# Patient Record
Sex: FEMALE | Race: WHITE | NOT HISPANIC OR LATINO
[De-identification: names, ages, dates, MRNs, and addresses within clinical notes are randomized per-mention and may not be internally consistent; named-entity substitution may affect disease eponyms.]

---

## 2017-06-06 ENCOUNTER — RESULT REVIEW (OUTPATIENT)
Age: 77
End: 2017-06-06

## 2018-01-16 ENCOUNTER — OUTPATIENT (OUTPATIENT)
Dept: OUTPATIENT SERVICES | Facility: HOSPITAL | Age: 78
LOS: 1 days | Discharge: HOME | End: 2018-01-16

## 2018-01-16 DIAGNOSIS — Z90.710 ACQUIRED ABSENCE OF BOTH CERVIX AND UTERUS: Chronic | ICD-10-CM

## 2018-01-16 DIAGNOSIS — Z96.7 PRESENCE OF OTHER BONE AND TENDON IMPLANTS: Chronic | ICD-10-CM

## 2018-01-16 DIAGNOSIS — Z98.89 OTHER SPECIFIED POSTPROCEDURAL STATES: Chronic | ICD-10-CM

## 2018-01-16 DIAGNOSIS — Z98.49 CATARACT EXTRACTION STATUS, UNSPECIFIED EYE: Chronic | ICD-10-CM

## 2018-01-16 DIAGNOSIS — Z96.642 PRESENCE OF LEFT ARTIFICIAL HIP JOINT: Chronic | ICD-10-CM

## 2018-01-16 DIAGNOSIS — K40.90 UNILATERAL INGUINAL HERNIA, WITHOUT OBSTRUCTION OR GANGRENE, NOT SPECIFIED AS RECURRENT: ICD-10-CM

## 2018-01-16 LAB
ANION GAP SERPL CALC-SCNC: 9 MMOL/L — SIGNIFICANT CHANGE UP (ref 5–17)
APTT BLD: 31.9 SEC — SIGNIFICANT CHANGE UP (ref 27.5–37.4)
BASOPHILS # BLD AUTO: 0.1 K/UL — SIGNIFICANT CHANGE UP (ref 0–0.2)
BASOPHILS NFR BLD AUTO: 1.6 % — SIGNIFICANT CHANGE UP (ref 0–2)
BUN SERPL-MCNC: 14 MG/DL — SIGNIFICANT CHANGE UP (ref 7–23)
CALCIUM SERPL-MCNC: 9.3 MG/DL — SIGNIFICANT CHANGE UP (ref 8.5–10.1)
CHLORIDE SERPL-SCNC: 102 MMOL/L — SIGNIFICANT CHANGE UP (ref 96–108)
CO2 SERPL-SCNC: 27 MMOL/L — SIGNIFICANT CHANGE UP (ref 22–31)
CREAT SERPL-MCNC: 0.52 MG/DL — SIGNIFICANT CHANGE UP (ref 0.5–1.3)
EOSINOPHIL # BLD AUTO: 0.1 K/UL — SIGNIFICANT CHANGE UP (ref 0–0.5)
EOSINOPHIL NFR BLD AUTO: 2.1 % — SIGNIFICANT CHANGE UP (ref 0–6)
GLUCOSE SERPL-MCNC: 90 MG/DL — SIGNIFICANT CHANGE UP (ref 70–99)
HCT VFR BLD CALC: 44.1 % — SIGNIFICANT CHANGE UP (ref 34.5–45)
HGB BLD-MCNC: 14.5 G/DL — SIGNIFICANT CHANGE UP (ref 11.5–15.5)
INR BLD: 0.95 RATIO — SIGNIFICANT CHANGE UP (ref 0.88–1.16)
LYMPHOCYTES # BLD AUTO: 2.2 K/UL — SIGNIFICANT CHANGE UP (ref 1–3.3)
LYMPHOCYTES # BLD AUTO: 36.5 % — SIGNIFICANT CHANGE UP (ref 13–44)
MCHC RBC-ENTMCNC: 30.3 PG — SIGNIFICANT CHANGE UP (ref 27–34)
MCHC RBC-ENTMCNC: 32.9 GM/DL — SIGNIFICANT CHANGE UP (ref 32–36)
MCV RBC AUTO: 92.2 FL — SIGNIFICANT CHANGE UP (ref 80–100)
MONOCYTES # BLD AUTO: 0.5 K/UL — SIGNIFICANT CHANGE UP (ref 0–0.9)
MONOCYTES NFR BLD AUTO: 8.5 % — SIGNIFICANT CHANGE UP (ref 2–14)
NEUTROPHILS # BLD AUTO: 3.1 K/UL — SIGNIFICANT CHANGE UP (ref 1.8–7.4)
NEUTROPHILS NFR BLD AUTO: 51.3 % — SIGNIFICANT CHANGE UP (ref 43–77)
PLATELET # BLD AUTO: 334 K/UL — SIGNIFICANT CHANGE UP (ref 150–400)
POTASSIUM SERPL-MCNC: 4.3 MMOL/L — SIGNIFICANT CHANGE UP (ref 3.5–5.3)
POTASSIUM SERPL-SCNC: 4.3 MMOL/L — SIGNIFICANT CHANGE UP (ref 3.5–5.3)
PROTHROM AB SERPL-ACNC: 10.2 SEC — SIGNIFICANT CHANGE UP (ref 9.8–12.7)
RBC # BLD: 4.78 M/UL — SIGNIFICANT CHANGE UP (ref 3.8–5.2)
RBC # FLD: 11.8 % — SIGNIFICANT CHANGE UP (ref 10.3–14.5)
SODIUM SERPL-SCNC: 138 MMOL/L — SIGNIFICANT CHANGE UP (ref 135–145)
WBC # BLD: 6 K/UL — SIGNIFICANT CHANGE UP (ref 3.8–10.5)
WBC # FLD AUTO: 6 K/UL — SIGNIFICANT CHANGE UP (ref 3.8–10.5)

## 2018-01-16 RX ORDER — LEVOTHYROXINE SODIUM 125 MCG
1 TABLET ORAL
Qty: 0 | Refills: 0 | COMMUNITY

## 2018-01-16 NOTE — CHART NOTE - NSCHARTNOTEFT_GEN_A_CORE
Plan  1. Stop all NSAIDS, herbal supplements and vitamins for 7 days.  2. NPO at midnight.  3. Take the following medications ( hydrocortisone, fludrocotisone ) with small sips of water on the morning of your procedure/surgery.  4. Use EZ sponges as directed  5. Use mupirocin as directed Plan  1. Stop all NSAIDS, herbal supplements and vitamins for 7 days.  2. NPO at midnight.  3. Take the following medications ( hydrocortisone, fludrocortisone ) with small sips of water on the morning of your procedure/surgery.  4. Use EZ sponges as directed  5. Use mupirocin as directed

## 2018-01-16 NOTE — ASU PATIENT PROFILE, ADULT - PMH
[place]'s disease    Dyslipidemia    Hypothyroidism    Osteoarthritis of right hip    Varicose veins [place]'s disease    Dyslipidemia    Hypothyroidism    Inguinal hernia    Osteoarthritis of right hip    Varicose veins

## 2018-01-16 NOTE — ASU PATIENT PROFILE, ADULT - PSH
History of cataract surgery  left 2017, right 01/2018  S/P bunionectomy  right, 2010  S/P foot surgery, right  reconstruction 2014  S/P hip replacement, left  2011, right THR 2016  S/P hysterectomy  1978  S/P ORIF (open reduction internal fixation) fracture  2013, left humerus

## 2018-01-22 ENCOUNTER — OUTPATIENT (OUTPATIENT)
Dept: OUTPATIENT SERVICES | Facility: HOSPITAL | Age: 78
LOS: 1 days | Discharge: HOME | End: 2018-01-22
Payer: MEDICARE

## 2018-01-22 ENCOUNTER — RESULT REVIEW (OUTPATIENT)
Age: 78
End: 2018-01-22

## 2018-01-22 VITALS
RESPIRATION RATE: 16 BRPM | OXYGEN SATURATION: 100 % | HEIGHT: 64 IN | WEIGHT: 133.38 LBS | DIASTOLIC BLOOD PRESSURE: 70 MMHG | TEMPERATURE: 98 F | HEART RATE: 66 BPM | SYSTOLIC BLOOD PRESSURE: 113 MMHG

## 2018-01-22 VITALS
DIASTOLIC BLOOD PRESSURE: 66 MMHG | TEMPERATURE: 98 F | OXYGEN SATURATION: 99 % | SYSTOLIC BLOOD PRESSURE: 125 MMHG | RESPIRATION RATE: 18 BRPM | HEART RATE: 76 BPM

## 2018-01-22 DIAGNOSIS — Z98.49 CATARACT EXTRACTION STATUS, UNSPECIFIED EYE: Chronic | ICD-10-CM

## 2018-01-22 DIAGNOSIS — Z96.7 PRESENCE OF OTHER BONE AND TENDON IMPLANTS: Chronic | ICD-10-CM

## 2018-01-22 DIAGNOSIS — Z98.89 OTHER SPECIFIED POSTPROCEDURAL STATES: Chronic | ICD-10-CM

## 2018-01-22 DIAGNOSIS — Z96.642 PRESENCE OF LEFT ARTIFICIAL HIP JOINT: Chronic | ICD-10-CM

## 2018-01-22 DIAGNOSIS — Z90.710 ACQUIRED ABSENCE OF BOTH CERVIX AND UTERUS: Chronic | ICD-10-CM

## 2018-01-22 PROCEDURE — 88302 TISSUE EXAM BY PATHOLOGIST: CPT | Mod: 26

## 2018-01-22 RX ORDER — LEVOTHYROXINE SODIUM 125 MCG
1 TABLET ORAL
Qty: 0 | Refills: 0 | COMMUNITY

## 2018-01-22 RX ORDER — FENTANYL CITRATE 50 UG/ML
50 INJECTION INTRAVENOUS
Qty: 0 | Refills: 0 | Status: DISCONTINUED | OUTPATIENT
Start: 2018-01-22 | End: 2018-01-22

## 2018-01-22 RX ORDER — MUPIROCIN 20 MG/G
1 OINTMENT TOPICAL
Qty: 0 | Refills: 0 | COMMUNITY

## 2018-01-22 RX ORDER — ONDANSETRON 8 MG/1
4 TABLET, FILM COATED ORAL ONCE
Qty: 0 | Refills: 0 | Status: DISCONTINUED | OUTPATIENT
Start: 2018-01-22 | End: 2018-01-22

## 2018-01-22 RX ORDER — HYDROCORTISONE 20 MG
0 TABLET ORAL
Qty: 0 | Refills: 0 | COMMUNITY

## 2018-01-22 RX ORDER — OXYCODONE HYDROCHLORIDE 5 MG/1
1 TABLET ORAL
Qty: 30 | Refills: 0 | OUTPATIENT
Start: 2018-01-22 | End: 2018-01-26

## 2018-01-22 RX ORDER — SODIUM CHLORIDE 9 MG/ML
1000 INJECTION INTRAMUSCULAR; INTRAVENOUS; SUBCUTANEOUS
Qty: 0 | Refills: 0 | Status: DISCONTINUED | OUTPATIENT
Start: 2018-01-22 | End: 2018-01-22

## 2018-01-22 NOTE — BRIEF OPERATIVE NOTE - PROCEDURE
<<-----Click on this checkbox to enter Procedure Right inguinal hernia repair  01/22/2018    Active  MEDARDO

## 2018-01-22 NOTE — ASU PATIENT PROFILE, ADULT - PMH
[place]'s disease    Dyslipidemia    Hypothyroidism    Inguinal hernia    Osteoarthritis of right hip    Varicose veins

## 2018-01-23 LAB — SURGICAL PATHOLOGY FINAL REPORT - CH: SIGNIFICANT CHANGE UP

## 2018-01-26 DIAGNOSIS — Z87.891 PERSONAL HISTORY OF NICOTINE DEPENDENCE: ICD-10-CM

## 2018-01-26 DIAGNOSIS — E27.1 PRIMARY ADRENOCORTICAL INSUFFICIENCY: ICD-10-CM

## 2018-01-26 DIAGNOSIS — E03.9 HYPOTHYROIDISM, UNSPECIFIED: ICD-10-CM

## 2018-01-26 DIAGNOSIS — K40.90 UNILATERAL INGUINAL HERNIA, WITHOUT OBSTRUCTION OR GANGRENE, NOT SPECIFIED AS RECURRENT: ICD-10-CM

## 2018-01-26 DIAGNOSIS — I34.0 NONRHEUMATIC MITRAL (VALVE) INSUFFICIENCY: ICD-10-CM

## 2018-01-26 DIAGNOSIS — E78.5 HYPERLIPIDEMIA, UNSPECIFIED: ICD-10-CM

## 2018-10-29 ENCOUNTER — EMERGENCY (EMERGENCY)
Facility: HOSPITAL | Age: 78
LOS: 0 days | Discharge: HOME | End: 2018-10-29
Attending: EMERGENCY MEDICINE
Payer: MEDICARE

## 2018-10-29 VITALS
DIASTOLIC BLOOD PRESSURE: 62 MMHG | OXYGEN SATURATION: 100 % | HEIGHT: 62 IN | SYSTOLIC BLOOD PRESSURE: 121 MMHG | HEART RATE: 99 BPM | TEMPERATURE: 99 F | RESPIRATION RATE: 20 BRPM | WEIGHT: 130.07 LBS

## 2018-10-29 VITALS
SYSTOLIC BLOOD PRESSURE: 94 MMHG | HEART RATE: 77 BPM | DIASTOLIC BLOOD PRESSURE: 65 MMHG | RESPIRATION RATE: 18 BRPM | OXYGEN SATURATION: 99 %

## 2018-10-29 DIAGNOSIS — Z90.710 ACQUIRED ABSENCE OF BOTH CERVIX AND UTERUS: Chronic | ICD-10-CM

## 2018-10-29 DIAGNOSIS — R94.31 ABNORMAL ELECTROCARDIOGRAM [ECG] [EKG]: ICD-10-CM

## 2018-10-29 DIAGNOSIS — Z87.19 PERSONAL HISTORY OF OTHER DISEASES OF THE DIGESTIVE SYSTEM: ICD-10-CM

## 2018-10-29 DIAGNOSIS — M25.551 PAIN IN RIGHT HIP: ICD-10-CM

## 2018-10-29 DIAGNOSIS — M19.90 UNSPECIFIED OSTEOARTHRITIS, UNSPECIFIED SITE: ICD-10-CM

## 2018-10-29 DIAGNOSIS — Z90.710 ACQUIRED ABSENCE OF BOTH CERVIX AND UTERUS: ICD-10-CM

## 2018-10-29 DIAGNOSIS — E03.9 HYPOTHYROIDISM, UNSPECIFIED: ICD-10-CM

## 2018-10-29 DIAGNOSIS — Z98.89 OTHER SPECIFIED POSTPROCEDURAL STATES: Chronic | ICD-10-CM

## 2018-10-29 DIAGNOSIS — Z98.49 CATARACT EXTRACTION STATUS, UNSPECIFIED EYE: Chronic | ICD-10-CM

## 2018-10-29 DIAGNOSIS — M79.604 PAIN IN RIGHT LEG: ICD-10-CM

## 2018-10-29 DIAGNOSIS — Z96.643 PRESENCE OF ARTIFICIAL HIP JOINT, BILATERAL: ICD-10-CM

## 2018-10-29 DIAGNOSIS — Z96.7 PRESENCE OF OTHER BONE AND TENDON IMPLANTS: Chronic | ICD-10-CM

## 2018-10-29 DIAGNOSIS — Z79.82 LONG TERM (CURRENT) USE OF ASPIRIN: ICD-10-CM

## 2018-10-29 DIAGNOSIS — Z96.642 PRESENCE OF LEFT ARTIFICIAL HIP JOINT: Chronic | ICD-10-CM

## 2018-10-29 PROBLEM — K40.90 UNILATERAL INGUINAL HERNIA, WITHOUT OBSTRUCTION OR GANGRENE, NOT SPECIFIED AS RECURRENT: Chronic | Status: ACTIVE | Noted: 2018-01-16

## 2018-10-29 LAB
ADD ON TEST-SPECIMEN IN LAB: SIGNIFICANT CHANGE UP
ADD ON TEST-SPECIMEN IN LAB: SIGNIFICANT CHANGE UP
APTT BLD: 26 SEC — LOW (ref 27.5–37.4)
BASOPHILS # BLD AUTO: 0.02 K/UL — SIGNIFICANT CHANGE UP (ref 0–0.2)
BASOPHILS NFR BLD AUTO: 0.3 % — SIGNIFICANT CHANGE UP (ref 0–2)
EOSINOPHIL # BLD AUTO: 0.15 K/UL — SIGNIFICANT CHANGE UP (ref 0–0.5)
EOSINOPHIL NFR BLD AUTO: 2.4 % — SIGNIFICANT CHANGE UP (ref 0–6)
HCT VFR BLD CALC: 38 % — SIGNIFICANT CHANGE UP (ref 34.5–45)
HGB BLD-MCNC: 12.5 G/DL — SIGNIFICANT CHANGE UP (ref 11.5–15.5)
IMM GRANULOCYTES NFR BLD AUTO: 0.2 % — SIGNIFICANT CHANGE UP (ref 0–1.5)
INR BLD: 0.99 RATIO — SIGNIFICANT CHANGE UP (ref 0.88–1.16)
LACTATE SERPL-SCNC: 1.8 MMOL/L — SIGNIFICANT CHANGE UP (ref 0.7–2)
LYMPHOCYTES # BLD AUTO: 1.16 K/UL — SIGNIFICANT CHANGE UP (ref 1–3.3)
LYMPHOCYTES # BLD AUTO: 18.8 % — SIGNIFICANT CHANGE UP (ref 13–44)
MCHC RBC-ENTMCNC: 30.6 PG — SIGNIFICANT CHANGE UP (ref 27–34)
MCHC RBC-ENTMCNC: 32.9 GM/DL — SIGNIFICANT CHANGE UP (ref 32–36)
MCV RBC AUTO: 92.9 FL — SIGNIFICANT CHANGE UP (ref 80–100)
MONOCYTES # BLD AUTO: 0.23 K/UL — SIGNIFICANT CHANGE UP (ref 0–0.9)
MONOCYTES NFR BLD AUTO: 3.7 % — SIGNIFICANT CHANGE UP (ref 2–14)
NEUTROPHILS # BLD AUTO: 4.59 K/UL — SIGNIFICANT CHANGE UP (ref 1.8–7.4)
NEUTROPHILS NFR BLD AUTO: 74.6 % — SIGNIFICANT CHANGE UP (ref 43–77)
PLATELET # BLD AUTO: 187 K/UL — SIGNIFICANT CHANGE UP (ref 150–400)
PROTHROM AB SERPL-ACNC: 10.7 SEC — SIGNIFICANT CHANGE UP (ref 9.8–12.7)
RBC # BLD: 4.09 M/UL — SIGNIFICANT CHANGE UP (ref 3.8–5.2)
RBC # FLD: 13.3 % — SIGNIFICANT CHANGE UP (ref 10.3–14.5)
TROPONIN I SERPL-MCNC: 0.02 NG/ML — SIGNIFICANT CHANGE UP (ref 0.01–0.04)
TROPONIN I SERPL-MCNC: <0.015 NG/ML — SIGNIFICANT CHANGE UP (ref 0.01–0.04)
TYPE + AB SCN PNL BLD: SIGNIFICANT CHANGE UP
WBC # BLD: 6.16 K/UL — SIGNIFICANT CHANGE UP (ref 3.8–10.5)
WBC # FLD AUTO: 6.16 K/UL — SIGNIFICANT CHANGE UP (ref 3.8–10.5)

## 2018-10-29 PROCEDURE — 71275 CT ANGIOGRAPHY CHEST: CPT | Mod: 26

## 2018-10-29 PROCEDURE — 71045 X-RAY EXAM CHEST 1 VIEW: CPT | Mod: 26

## 2018-10-29 PROCEDURE — 74175 CTA ABDOMEN W/CONTRAST: CPT | Mod: 26

## 2018-10-29 PROCEDURE — 72100 X-RAY EXAM L-S SPINE 2/3 VWS: CPT | Mod: 26

## 2018-10-29 PROCEDURE — 99285 EMERGENCY DEPT VISIT HI MDM: CPT | Mod: 25

## 2018-10-29 PROCEDURE — 73502 X-RAY EXAM HIP UNI 2-3 VIEWS: CPT | Mod: 26,RT

## 2018-10-29 PROCEDURE — 73552 X-RAY EXAM OF FEMUR 2/>: CPT | Mod: 26,RT

## 2018-10-29 PROCEDURE — 93010 ELECTROCARDIOGRAM REPORT: CPT

## 2018-10-29 RX ORDER — ONDANSETRON 8 MG/1
4 TABLET, FILM COATED ORAL ONCE
Qty: 0 | Refills: 0 | Status: COMPLETED | OUTPATIENT
Start: 2018-10-29 | End: 2018-10-29

## 2018-10-29 RX ORDER — KETOROLAC TROMETHAMINE 30 MG/ML
15 SYRINGE (ML) INJECTION ONCE
Qty: 0 | Refills: 0 | Status: DISCONTINUED | OUTPATIENT
Start: 2018-10-29 | End: 2018-10-29

## 2018-10-29 RX ORDER — ACETAMINOPHEN 500 MG
650 TABLET ORAL ONCE
Qty: 0 | Refills: 0 | Status: COMPLETED | OUTPATIENT
Start: 2018-10-29 | End: 2018-10-29

## 2018-10-29 RX ORDER — HYDROCORTISONE 20 MG
20 TABLET ORAL ONCE
Qty: 0 | Refills: 0 | Status: COMPLETED | OUTPATIENT
Start: 2018-10-29 | End: 2018-10-29

## 2018-10-29 RX ORDER — SODIUM CHLORIDE 9 MG/ML
500 INJECTION INTRAMUSCULAR; INTRAVENOUS; SUBCUTANEOUS ONCE
Qty: 0 | Refills: 0 | Status: COMPLETED | OUTPATIENT
Start: 2018-10-29 | End: 2018-10-29

## 2018-10-29 RX ORDER — SODIUM CHLORIDE 9 MG/ML
1000 INJECTION INTRAMUSCULAR; INTRAVENOUS; SUBCUTANEOUS ONCE
Qty: 0 | Refills: 0 | Status: COMPLETED | OUTPATIENT
Start: 2018-10-29 | End: 2018-10-29

## 2018-10-29 RX ORDER — MORPHINE SULFATE 50 MG/1
2 CAPSULE, EXTENDED RELEASE ORAL ONCE
Qty: 0 | Refills: 0 | Status: DISCONTINUED | OUTPATIENT
Start: 2018-10-29 | End: 2018-10-29

## 2018-10-29 RX ORDER — FLUDROCORTISONE ACETATE 0.1 MG/1
0.05 TABLET ORAL ONCE
Qty: 0 | Refills: 0 | Status: COMPLETED | OUTPATIENT
Start: 2018-10-29 | End: 2018-10-29

## 2018-10-29 RX ADMIN — Medication 15 MILLIGRAM(S): at 11:55

## 2018-10-29 RX ADMIN — Medication 20 MILLIGRAM(S): at 15:45

## 2018-10-29 RX ADMIN — SODIUM CHLORIDE 1000 MILLILITER(S): 9 INJECTION INTRAMUSCULAR; INTRAVENOUS; SUBCUTANEOUS at 08:18

## 2018-10-29 RX ADMIN — ONDANSETRON 4 MILLIGRAM(S): 8 TABLET, FILM COATED ORAL at 08:33

## 2018-10-29 RX ADMIN — SODIUM CHLORIDE 1000 MILLILITER(S): 9 INJECTION INTRAMUSCULAR; INTRAVENOUS; SUBCUTANEOUS at 09:20

## 2018-10-29 RX ADMIN — FLUDROCORTISONE ACETATE 0.05 MILLIGRAM(S): 0.1 TABLET ORAL at 15:45

## 2018-10-29 RX ADMIN — SODIUM CHLORIDE 500 MILLILITER(S): 9 INJECTION INTRAMUSCULAR; INTRAVENOUS; SUBCUTANEOUS at 13:30

## 2018-10-29 RX ADMIN — Medication 15 MILLIGRAM(S): at 12:38

## 2018-10-29 RX ADMIN — SODIUM CHLORIDE 500 MILLILITER(S): 9 INJECTION INTRAMUSCULAR; INTRAVENOUS; SUBCUTANEOUS at 14:56

## 2018-10-29 RX ADMIN — MORPHINE SULFATE 2 MILLIGRAM(S): 50 CAPSULE, EXTENDED RELEASE ORAL at 08:33

## 2018-10-29 RX ADMIN — Medication 650 MILLIGRAM(S): at 08:33

## 2018-10-29 RX ADMIN — MORPHINE SULFATE 2 MILLIGRAM(S): 50 CAPSULE, EXTENDED RELEASE ORAL at 08:45

## 2018-10-29 NOTE — ED ADULT NURSE NOTE - PMH
[place]'s disease    Dyslipidemia    Hypothyroidism    Inguinal hernia    Osteoarthritis of right hip    Varicose veins

## 2018-10-29 NOTE — ED PROVIDER NOTE - NEUROLOGICAL, MLM
Alert and oriented, no focal deficits, no motor or sensory deficits. cn 2-12 intact, 5/5 strength in all 4 ext, sensation intact, no dysmetria

## 2018-10-29 NOTE — PHYSICAL THERAPY INITIAL EVALUATION ADULT - ADDITIONAL COMMENTS
Driving, Community Ambulator.  Patient is relocating soon, was moving boxes around in her home.  Had a Fidel sandwich and Prosecho last pm.

## 2018-10-29 NOTE — ED ADULT NURSE NOTE - OBJECTIVE STATEMENT
Pt c/o "throwing up five times last night and waking up with terrible pain in my leg", pt c/o pain to right leg, no deformity noted, denies trauma/fall, denies pain/burning upon urination, denies abd pain

## 2018-10-29 NOTE — ED PROVIDER NOTE - OBJECTIVE STATEMENT
78 yo f with hypothyroidism, hld presents with vomiting. she states yesterday she ate a steffanie sandwhich and drank some prosecco and does not think it agreed with her.  she has been doing a lot of heavy lifting and packing as well as she is moving.  c/o profusing nonbloody vomiting c 5 yesterday and loose stools.  this morning she has vomiting x 1, and right leg/hip pain.  prior right hip replacement.  no aortic pathology, no chest pain or sob. no numbness or tingling

## 2018-10-29 NOTE — PHYSICAL THERAPY INITIAL EVALUATION ADULT - GAIT TRAINING, PT EVAL
STG: Patient independent with ambulation using RW x 150 feet in 1 day.  LTG: independent Community Ambulator with minimal assistive device in 2 weeks.

## 2018-10-29 NOTE — ED ADULT NURSE NOTE - NSIMPLEMENTINTERV_GEN_ALL_ED
Implemented All Fall Risk Interventions:  [place] to call system. Call bell, personal items and telephone within reach. Instruct patient to call for assistance. Room bathroom lighting operational. Non-slip footwear when patient is off stretcher. Physically safe environment: no spills, clutter or unnecessary equipment. Stretcher in lowest position, wheels locked, appropriate side rails in place. Provide visual cue, wrist band, yellow gown, etc. Monitor gait and stability. Monitor for mental status changes and reorient to person, place, and time. Review medications for side effects contributing to fall risk. Reinforce activity limits and safety measures with patient and family.

## 2018-10-29 NOTE — ED PROVIDER NOTE - PMH
[place]'s disease    Dyslipidemia    Hypothyroidism    Inguinal hernia    Osteoarthritis of right hip    Varicose veins

## 2018-10-29 NOTE — ED PROVIDER NOTE - MEDICAL DECISION MAKING DETAILS
elderly f with vomiting, hypotension and right groin/hip pain.  imaging, blood work. low suspicion for aortic pathology but given hypotension and groin pain will order, pain meds, will reassess

## 2018-10-29 NOTE — ED ADULT TRIAGE NOTE - CHIEF COMPLAINT QUOTE
Patient presents from home with right hip pain and leg pain and nausea and vomiting since 3 AM. EMS reports patient was hypotensive at scene with BP of 60/30. Patient was given 700cc of fluid pressure came up to 110/70. Patient denies chest pain shortness of breath, denies fall or trauma

## 2018-10-29 NOTE — CONSULT NOTE ADULT - ATTENDING COMMENTS
Patient seen and examined in ER.  No signs of septic hip  No orthopedic intervention at this time  Will continue observation.  I agree with the note

## 2018-10-29 NOTE — ED PROVIDER NOTE - PROGRESS NOTE DETAILS
pt is up and ambulating with walker, asking for her meds for additions.  will give one dose and d/c home

## 2018-10-29 NOTE — CONSULT NOTE ADULT - SUBJECTIVE AND OBJECTIVE BOX
77y Female community ambulator presents c/o R hip pain. Patient is s/p R LESLI in 2016 with Dr. Christiano Duenas. States she was suddenly woken up by R anterior, proximal thigh pain and inability to ambulate. She denies any injuries. She has been moving so has been doing more activity than usual. But no injuries or falls. Denies any paresthesias. Denies HS/LOC. Denies numbness/tingling. Denies fever/chills. Denies pain/injury elsewhere.     Family history of esophageal cancer (Sibling)  Family history of stomach cancer (Sibling)  MEWS Score  Inguinal hernia  Varicose veins  Hypothyroidism  Dyslipidemia  [place]'s disease  Osteoarthritis of right hip  History of cataract surgery  S/P foot surgery, right  S/P ORIF (open reduction internal fixation) fracture  S/P hip replacement, left  S/P bunionectomy  S/P hysterectomy  LEG HIP PAIN HYPOTENSIVE  90+    PAST MEDICAL & SURGICAL HISTORY:  Inguinal hernia  Varicose veins  Hypothyroidism  Dyslipidemia  [place]'s disease  Osteoarthritis of right hip  History of cataract surgery: left 2017, right 01/2018  S/P foot surgery, right: reconstruction 2014  S/P ORIF (open reduction internal fixation) fracture: 2013, left humerus  S/P hip replacement, left: 2011, right THR 2016  S/P bunionectomy: right, 2010  S/P hysterectomy: 1978    MEDICATIONS  (STANDING):    Allergies    No Known Allergies    Intolerances                            12.5   6.16  )-----------( 187      ( 29 Oct 2018 08:02 )             38.0     29 Oct 2018 08:02    140    |  107    |  19     ----------------------------<  93     3.8     |  24     |  0.64     Ca    8.2        29 Oct 2018 08:02    TPro  6.1    /  Alb  3.4    /  TBili  0.5    /  DBili  x      /  AST  22     /  ALT  17     /  AlkPhos  66     29 Oct 2018 08:02    PT/INR - ( 29 Oct 2018 08:02 )   PT: 10.7 sec;   INR: 0.99 ratio         PTT - ( 29 Oct 2018 08:02 )  PTT:26.0 sec  Vital Signs Last 24 Hrs  T(C): 38.3 (10-29-18 @ 10:29), Max: 39.1 (10-29-18 @ 08:19)  T(F): 100.9 (10-29-18 @ 10:29), Max: 102.4 (10-29-18 @ 08:19)  HR: 83 (10-29-18 @ 12:42) (66 - 138)  BP: 96/42 (10-29-18 @ 12:42) (59/44 - 121/62)  BP(mean): --  RR: 18 (10-29-18 @ 12:42) (18 - 20)  SpO2: 97% (10-29-18 @ 12:42) (97% - 100%)    Imaging: XR Hip/Pelvis negative for fracture. Bony CT Pelvis Negative for fracture**    Physical Exam  General: NAD, Alert, Awake and oriented  Neurologic: No gross deficits, moving all 4s.  Head: NCAT without abrasions, lacerations, or ecchymosis to head, face, or scalp  Eyes: PERRL  Neck/C-Spine: FAROM. No bony TTP.  T/L Spine: No bony TTP, no palpable step-off.    HIPS and PELVIS: Able to SLR ***Hip.     RIGHT LE:  No open skin. No deformities or other signs of trauma at hip, knee, lower leg, ankle or foot. Full baseline painless ROM at [place], Knee, ankle and toes. Negative log-roll and heel strike. No groin or hip  pain on Passive internal or external rotation Able to actively SLR. SILT toes 1-5. 2+ DP/PT pulses with brisk cap refill distally. Compartments soft and compressible. EHL/FHL/TA/GS intact 5/5.    LEFT LE:  No open skin. No deformities or other signs of trauma at hip, knee, lower leg, ankle or foot. Full baseline painless ROM at [place], Knee, ankle and toes. Negative log-roll and heel strike. No groin or hip  pain on Passive internal or external rotation Able to actively SLR. SILT toes 1-5. 2+ DP/PT pulses with brisk cap refill distally. Compartments soft and compressible. EHL/FHL/TA/GS intact 5/5.      RIGHT UE: No open skin. No obvious deformities or other signs of trauma at shoulder, upper arm, elbow, forearm, wrist or hand.  Full baseline painless ROM at shoulder, elbow, wrist, and . No bony TTP. Compartments soft and compressible.  strength symmetric.    LEFT UE: No open skin. No obvious deformities or other signs of trauma at shoulder, upper arm, elbow, forearm, wrist or hand.  Full baseline painless ROM at shoulder, elbow, wrist, and . No bony TTP. Compartments soft and compressible.  strength symmetric.    A/P: 77y Female with Right Left** hip pain/Osteoarthritis/Trochanteric Bursitis  Pain control  WBAT  Antiinflammatories as tolerated  PT  Rolling walker PRN  Consider Neurosurgery Consult for Lumbar Radiculopathy  DVT PE ppx  DIscussed with Orthopedic Attending  ** 77y Female community ambulator presents c/o R hip pain. Patient is s/p R LESLI in 2016 with Dr. Christiano Duenas. States she was suddenly woken up by R anterior, proximal thigh pain and inability to ambulate. She denies any injuries. She has been moving so has been doing more activity than usual. But no injuries or falls. Denies any paresthesias. Had Toradol here in the ED which has helped dramatically. Denies HS/LOC. Was also having some vague low back pain which was subtle and resolved at this time. Denies pain/injury elsewhere. Also complaining of nausea which is being worked up by the ER staff.     Family history of esophageal cancer (Sibling)  Family history of stomach cancer (Sibling)  MEWS Score  Inguinal hernia  Varicose veins  Hypothyroidism  Dyslipidemia  [place]'s disease  Osteoarthritis of right hip  History of cataract surgery  S/P foot surgery, right  S/P ORIF (open reduction internal fixation) fracture  S/P hip replacement, left  S/P bunionectomy  S/P hysterectomy  LEG HIP PAIN HYPOTENSIVE  90+    PAST MEDICAL & SURGICAL HISTORY:  Inguinal hernia  Varicose veins  Hypothyroidism  Dyslipidemia  [place]'s disease  Osteoarthritis of right hip  History of cataract surgery: left 2017, right 01/2018  S/P foot surgery, right: reconstruction 2014  S/P ORIF (open reduction internal fixation) fracture: 2013, left humerus  S/P hip replacement, left: 2011, right THR 2016  S/P bunionectomy: right, 2010  S/P hysterectomy: 1978    MEDICATIONS  (STANDING):    Allergies    No Known Allergies    Intolerances                            12.5   6.16  )-----------( 187      ( 29 Oct 2018 08:02 )             38.0     29 Oct 2018 08:02    140    |  107    |  19     ----------------------------<  93     3.8     |  24     |  0.64     Ca    8.2        29 Oct 2018 08:02    TPro  6.1    /  Alb  3.4    /  TBili  0.5    /  DBili  x      /  AST  22     /  ALT  17     /  AlkPhos  66     29 Oct 2018 08:02    PT/INR - ( 29 Oct 2018 08:02 )   PT: 10.7 sec;   INR: 0.99 ratio         PTT - ( 29 Oct 2018 08:02 )  PTT:26.0 sec  Vital Signs Last 24 Hrs  T(C): 38.3 (10-29-18 @ 10:29), Max: 39.1 (10-29-18 @ 08:19)  T(F): 100.9 (10-29-18 @ 10:29), Max: 102.4 (10-29-18 @ 08:19)  HR: 83 (10-29-18 @ 12:42) (66 - 138)  BP: 96/42 (10-29-18 @ 12:42) (59/44 - 121/62)  BP(mean): --  RR: 18 (10-29-18 @ 12:42) (18 - 20)  SpO2: 97% (10-29-18 @ 12:42) (97% - 100%)    Physical Exam  General: NAD, Alert, Awake and oriented  Neurologic: No gross deficits, moving all 4s.  Neck/C-Spine: FAROM. No bony TTP.  T/L Spine: No bony TTP, no palpable step-off.    HIPS and PELVIS: Able to SLR bilateral Hips.     RIGHT LE:  Previous surgical incision well healed without redness or drainage. No open skin. TTP in the musculature of the proximal anterior medial thigh. No groin pain. No lateral hip pain. No pain with ROM of the hip; it is smooth with crepitus or clunking. No deformities or other signs of trauma at hip, knee, lower leg, ankle or foot. Full painless ROM at [place], Knee, ankle and toes. Negative log-roll and heel strike. Able to actively SLR. SILT toes 1-5. 2+ DP/PT pulses with brisk cap refill distally. Compartments soft and compressible. EHL/FHL/TA/GS intact 5/5.    LEFT LE:  No open skin. No deformities or other signs of trauma at hip, knee, lower leg, ankle or foot. Full baseline painless ROM at Hip, Knee, ankle and toes. Negative log-roll and heel strike. No groin or hip  pain on Passive internal or external rotation Able to actively SLR. SILT toes 1-5. 2+ DP/PT pulses with brisk cap refill distally. Compartments soft and compressible. EHL/FHL/TA/GS intact 5/5.      RIGHT UE: No open skin. No obvious deformities or other signs of trauma at shoulder, upper arm, elbow, forearm, wrist or hand.  Full baseline painless ROM at shoulder, elbow, wrist, and . No bony TTP. Compartments soft and compressible.  strength symmetric.    LEFT UE: No open skin. No obvious deformities or other signs of trauma at shoulder, upper arm, elbow, forearm, wrist or hand.  Full baseline painless ROM at shoulder, elbow, wrist, and . No bony TTP. Compartments soft and compressible.  strength symmetric.    Imaging: XR Hip/Pelvis negative for fracture. R LESLI arthroplasty in tact without dislocation or fracture. Well positioned.

## 2018-10-29 NOTE — CONSULT NOTE ADULT - ASSESSMENT
A/P: 77y Female s/p R LESLI with atraumatic onset of R anterior/medial thigh pain related to musculature strain.     Pain control prn, recommend antiinflammatories; Pain has significantly improved with Toradol in the ED.   WBAT with assistive device as needed.   PT for ambulation.   Rolling walker PRN  FU XR Lumbar Spine  DVT PE ppx as per medical team recommendations  No acute surgical intervention from an orthopedic standpoint.  Patient is stable for discharge from an orthopedic standpoint.   Patient seen and examined with attending Dr. Smith.

## 2018-10-29 NOTE — PHYSICAL THERAPY INITIAL EVALUATION ADULT - GENERAL OBSERVATIONS, REHAB EVAL
Patient received on stretcher in ED. Neighbor/friend at bedside. Patient c/o R "thigh" pain at rest 5/10, reported pain better with ambulation, not able to quantify.  Patient concerned she has not taken her steroids for he Ten's ds.  Noted B hand edema. Patient yawning frequently while supine,   O2 sats 96% on room air.

## 2018-10-29 NOTE — ED PROVIDER NOTE - MUSCULOSKELETAL, MLM
Spine appears normal, range of motion is not limited, right hip and femur ttp.  +_ dorsalis pedis pulses on b/l feet

## 2018-11-03 LAB
CULTURE RESULTS: SIGNIFICANT CHANGE UP
CULTURE RESULTS: SIGNIFICANT CHANGE UP
SPECIMEN SOURCE: SIGNIFICANT CHANGE UP
SPECIMEN SOURCE: SIGNIFICANT CHANGE UP

## 2020-03-13 NOTE — ED ADULT NURSE NOTE - TOBACCO SCREENING (FROM THE ASSIST)
Coronavirus (COVID-19) Notification    Reason for call  Notify of Negative COVID-19 lab result, assess symptoms,  review [facility] recommendations    Lab Result    Lab test 2019-nCoV rRt-PCR  Oropharyngeal AND/OR nasopharyngeal swabs were NEGATIVE for 2019-nCoV RNA    RN Assessment (Patient s current Symptoms), include time called.  [Insert Left message here if message left]  Feeling better but still dealing with slight cough and lightheadedness.  I will continue quaranteeing myself as I don't trust these results.      RN Recommendations/Instructions per [facility]  Patient notified of Negative COVID-19.    Patient can discontinue Quarantee and is free to resume normal activites.  If Patient has questions that you are not able to answer they can contact PCP or MD hotline ([phone])    Please Contact your PCP clinic or return to the Emergency department if your:    Symptoms worsen or other concerning symptom's.      [RN Name]  Wolfgang Frederick RN  [facility] [place] - [facility]  Emergency Dept Lab Result RN  Ph# [phone]      
Statement Selected

## 2021-10-03 NOTE — PHYSICAL THERAPY INITIAL EVALUATION ADULT - PERTINENT HX OF CURRENT PROBLEM, REHAB EVAL
Sitting up in chair with legs elevated most of the day. Diuretics given as ordered. Voiding well but not in excess with meds. dsg changes to bilat lower legs done. Ocquicell not changed, blister to Lt foot partially draining and covered. Posterior opening onLt leg draining slightly. Rt leg oozing slowly and dsg changed. Fresh ace wrap from toes to knees placed and intact. Medicated with Tylenol for pain. Did not want Oxycodone as he stated it makes him too dizzy and lightheaded and doesn't like that feeling. Bedside shift change report given to Carroll Chavez RN (oncoming nurse) by Heladio Jensen RN (offgoing nurse). Report included the following information SBAR, Kardex, Intake/Output and MAR. 78 yo F presents to the ED c/o R hip pain.  S/P  R THR 2016.  X-rays (-), Abdominal CT (-).  X-Ray LS: + DDD/DJD

## 2023-02-17 NOTE — ASU PREOP CHECKLIST - LATEX ALLERGY
PROCEDURE: Dexa Spine and/or Hip

 

INDICATIONS: POST MENOPAUSAL

 

TECHNIQUE: Dual energy x-ray absorptiometry (DXA) was performed on a Celery System.  Regions measur
ed are the AP Spine, femoral neck, and if needed forearm.

 

COMPARISON: None.

  

FINDINGS:

 

Lumbar Spine: 

Bone Mineral Density   1.257 g/cm/cm,T score  0.6, normal

 

Left Femoral Neck:

Bone Mineral Density  0.821 g/cm/cm, T score -1.6, osteopenia

 

Left Hip:

Bone Mineral Density  0.878 g/cm/cm,T score -1.0, low normal

 

(T score greater or equal to -1.0: NORMAL)

(T score from -1.1 to -2.4: OSTEOPENIA)

(T score less than or equal to -2.5 to: OSTEOPOROSIS)

 

Impression: 

Left femoral neck osteopenia

 

Patients with diagnosis of osteoporosis or osteopenia should have regular bone mineral density assess
ment.  For those eligible for Medicare, routine testing is allowed once every 2 years.  Testing frequ
ency can be increased for patients who have rapidly progressing disease or for those who are receivin
g medical therapy to restore bone mass.

 

Reviewed by: Pantera Romero MD on 2/17/2023 1:37 PM AKST

Approved by: Pantera Romero MD on 2/17/2023 1:37 PM AK

 

 

Station ID:  [id number]

## 2023-08-02 NOTE — ASU PATIENT PROFILE, ADULT - CAREGIVER PHONE NUMBER
[phone] Clofazimine Counseling:  I discussed with the patient the risks of clofazimine including but not limited to skin and eye pigmentation, liver damage, nausea/vomiting, gastrointestinal bleeding and allergy.

## 2024-05-25 NOTE — ED PROVIDER NOTE - CPE EDP HEME LYMPH NORM
Gen: NAD, AAOx3, non-toxic appearing but extremely uncomfortable rolling around on bed  HEENT: NCAT, normal conjunctiva, oral mucosa moist  Lung:  good aeration bilaterally, lungs CTA b/l  CV: regular rate and rhythm. cap refill <2x. peripheral pulses 2+bilaterally   Abd: soft, ND, no CVA tenderness BL, some RLQ pain to palpating, no guarding or rebound, some suprapubic pain  MSK: no visible deformities  Neuro: No focal deficits  Skin: Intact  Psych: normal affect normal...

## 2024-06-21 NOTE — ASU DISCHARGE PLAN (ADULT/PEDIATRIC). - MEDICATION SUMMARY - MEDICATIONS TO TAKE
[de-identified] : ECG (3/24/23): normal sinus rhythm  ECG (5/4/23): normal sinus rhythm  ECG (6/9/23): normal sinus rhythm with sinus arrhythmia  ECG (9/15/23): sinus bradycardia  ECG (6/20/24): normal sinus rhythm [de-identified] : Nuclear stress test (2008) at outside facility: Normal Exercise treadmill stress test (5/2023): Inferolateral horizontal ST depressions (108% MPHR). No symptoms. [de-identified] : TTE (2021) at outside facility: LVEF 65%. Mild MR. Mild TR   TTE (4/2023): LVEF 55-60%, mild LVH. Normal RV size and systolic function. Mild MR. Mild TR. No aortic valve stenosis. Mild AR. No pericardial effusion. [de-identified] : CT Heart Calcium Score (3/2023): 1961   CT Coronary Angiogram (4/2023): Calcium score 1791. LM < 25% stenosis. LAD moderate luminal narrowing. Diagonals normal. LCx proximal moderate luminal narrowing. OM3 significant severe stenosis secondary to calcified and noncalcified plaque. RCA mild luminal narrowing.   CT FFR (4/2023): High probability for lesion specific ischemia in OM3, PDA and distal LAD  [de-identified] : Carotid US (4/2023): Right proximal ICA 1-19% stenosis. Left proximal ICA 1-19% stenosis.  I will START or STAY ON the medications listed below when I get home from the hospital:    fludrocortisone  -- 0.05 milligram(s) by mouth once a day  -- 0.5 mg PO  -- Indication: For UNIL INGUINAL HERNIA, W/O OBST OR GANGR, NOT SPCF AS RECUR    hydrocortisone 10 mg oral tablet  -- orally 2 times a day  -- Indication: For UNIL INGUINAL HERNIA, W/O OBST OR GANGR, NOT SPCF AS RECUR    oxyCODONE 5 mg oral tablet  -- 1 tab(s) by mouth every 4 hours, As Needed -for moderate pain MDD:6  -- Caution federal law prohibits the transfer of this drug to any person other  than the person for whom it was prescribed.  It is very important that you take or use this exactly as directed.  Do not skip doses or discontinue unless directed by your doctor.  May cause drowsiness.  Alcohol may intensify this effect.  Use care when operating dangerous machinery.  This prescription cannot be refilled.  Using more of this medication than prescribed may cause serious breathing problems.    -- Indication: For UNIL INGUINAL HERNIA, W/O OBST OR GANGR, NOT SPCF AS RECUR    Aspirin Enteric Coated 325 mg oral delayed release tablet  -- 1 tab(s) by mouth once a day    hold 1 week before surgery  -- Swallow whole.  Do not crush.  Take with food or milk.    -- Indication: For UNIL INGUINAL HERNIA, W/O OBST OR GANGR, NOT SPCF AS RECUR    simvastatin 20 mg oral tablet  -- 1 tab(s) by mouth once a day (at bedtime)  -- Indication: For UNIL INGUINAL HERNIA, W/O OBST OR GANGR, NOT SPCF AS RECUR    mupirocin 2% nasal ointment  -- 1 application into nose 2 times a day  -- Indication: For UNIL INGUINAL HERNIA, W/O OBST OR GANGR, NOT SPCF AS RECUR    levothyroxine 125 mcg (0.125 mg) oral tablet  -- 1 tab(s) by mouth once a day (at bedtime)  -- Indication: For UNIL INGUINAL HERNIA, W/O OBST OR GANGR, NOT SPCF AS RECUR    vitamin E 400 intl units oral capsule  -- 1 cap(s) by mouth once a day  -- Indication: For UNIL INGUINAL HERNIA, W/O OBST OR GANGR, NOT SPCF AS RECUR    Vitamin D3 5000 intl units oral capsule  -- 1 cap(s) by mouth once a day  -- Indication: For UNIL INGUINAL HERNIA, W/O OBST OR GANGR, NOT SPCF AS RECUR

## 2025-06-19 NOTE — ASU PATIENT PROFILE, ADULT - NS SC CAGE ALCOHOL GUILTY ABOUT
no
directives. You can change them any time your wishes change.  Living will. This form tells your loved ones and doctor your wishes about life support and other treatment. The form is also called a declaration.  Medical power of . This form lets you name a person to make treatment decisions for you when you can't speak for yourself. This person is called a health care agent (health care proxy, health care surrogate). The form is also called a durable power of  for health care.  If you do not have an advance directive, decisions about your medical care may be made by a family member or doctor who doesn't know you or by a .  It may help to think of an advance directive as a gift to the people who care for you. If you have one, they won't have to make tough decisions by themselves.  For more information, including forms for your state, see the [employer] website (www.[employer].org/planning/advance-directives/).  Follow-up care is a key part of your treatment and safety. Be sure to make and go to all appointments, and call your doctor if you are having problems. It's also a good idea to know your test results and keep a list of the medicines you take.  What should you include in an advance directive?  Many states have a unique advance directive form. (It may ask you to address specific issues.) Or you might use a universal form that's approved by many states.  If your form doesn't tell you what to address, it may be hard to know what to include in your advance directive. Use the questions below to help you get started.  Who do you want to make decisions about your medical care if you are not able to?  What life-support measures do you want if you have a serious illness that gets worse over time or can't be cured?  What are you most afraid of that might happen? (Maybe you're afraid of having pain, losing your independence, or being kept alive by machines.)  Where would you prefer to die? (Your home? A